# Patient Record
Sex: FEMALE | Race: BLACK OR AFRICAN AMERICAN | NOT HISPANIC OR LATINO
[De-identification: names, ages, dates, MRNs, and addresses within clinical notes are randomized per-mention and may not be internally consistent; named-entity substitution may affect disease eponyms.]

---

## 2017-10-11 ENCOUNTER — TRANSCRIPTION ENCOUNTER (OUTPATIENT)
Age: 37
End: 2017-10-11

## 2017-11-14 ENCOUNTER — TRANSCRIPTION ENCOUNTER (OUTPATIENT)
Age: 37
End: 2017-11-14

## 2017-11-29 ENCOUNTER — TRANSCRIPTION ENCOUNTER (OUTPATIENT)
Age: 37
End: 2017-11-29

## 2018-04-16 ENCOUNTER — TRANSCRIPTION ENCOUNTER (OUTPATIENT)
Age: 38
End: 2018-04-16

## 2018-04-24 ENCOUNTER — TRANSCRIPTION ENCOUNTER (OUTPATIENT)
Age: 38
End: 2018-04-24

## 2020-11-03 ENCOUNTER — OUTPATIENT (OUTPATIENT)
Dept: INPATIENT UNIT | Facility: HOSPITAL | Age: 40
LOS: 1 days | Discharge: HOME | End: 2020-11-03
Payer: COMMERCIAL

## 2020-11-03 ENCOUNTER — ASOB RESULT (OUTPATIENT)
Age: 40
End: 2020-11-03

## 2020-11-03 ENCOUNTER — EMERGENCY (EMERGENCY)
Facility: HOSPITAL | Age: 40
LOS: 1 days | Discharge: STILL ADMITTED OR ADMINISTRATIVE | End: 2020-11-03
Admitting: EMERGENCY MEDICINE

## 2020-11-03 ENCOUNTER — APPOINTMENT (OUTPATIENT)
Dept: ANTEPARTUM | Facility: HOSPITAL | Age: 40
End: 2020-11-03

## 2020-11-03 VITALS — SYSTOLIC BLOOD PRESSURE: 138 MMHG | HEART RATE: 64 BPM | DIASTOLIC BLOOD PRESSURE: 78 MMHG

## 2020-11-03 VITALS
TEMPERATURE: 97 F | DIASTOLIC BLOOD PRESSURE: 74 MMHG | RESPIRATION RATE: 16 BRPM | SYSTOLIC BLOOD PRESSURE: 134 MMHG | HEART RATE: 70 BPM

## 2020-11-03 VITALS — DIASTOLIC BLOOD PRESSURE: 81 MMHG | HEART RATE: 59 BPM | SYSTOLIC BLOOD PRESSURE: 144 MMHG

## 2020-11-03 DIAGNOSIS — Z98.891 HISTORY OF UTERINE SCAR FROM PREVIOUS SURGERY: Chronic | ICD-10-CM

## 2020-11-03 DIAGNOSIS — O26.899 OTHER SPECIFIED PREGNANCY RELATED CONDITIONS, UNSPECIFIED TRIMESTER: ICD-10-CM

## 2020-11-03 DIAGNOSIS — D27.0 BENIGN NEOPLASM OF RIGHT OVARY: Chronic | ICD-10-CM

## 2020-11-03 DIAGNOSIS — Z90.81 ACQUIRED ABSENCE OF SPLEEN: Chronic | ICD-10-CM

## 2020-11-03 DIAGNOSIS — Z3A.00 WEEKS OF GESTATION OF PREGNANCY NOT SPECIFIED: ICD-10-CM

## 2020-11-03 DIAGNOSIS — Z98.890 OTHER SPECIFIED POSTPROCEDURAL STATES: Chronic | ICD-10-CM

## 2020-11-03 LAB
ALBUMIN SERPL ELPH-MCNC: 3.5 G/DL — SIGNIFICANT CHANGE UP (ref 3.3–5)
ALP SERPL-CCNC: 57 U/L — SIGNIFICANT CHANGE UP (ref 40–120)
ALT FLD-CCNC: 26 U/L — SIGNIFICANT CHANGE UP (ref 4–33)
ANION GAP SERPL CALC-SCNC: 11 MMO/L — SIGNIFICANT CHANGE UP (ref 7–14)
APPEARANCE UR: CLEAR — SIGNIFICANT CHANGE UP
APTT BLD: 28.7 SEC — SIGNIFICANT CHANGE UP (ref 27–36.3)
AST SERPL-CCNC: 21 U/L — SIGNIFICANT CHANGE UP (ref 4–32)
BACTERIA # UR AUTO: NEGATIVE — SIGNIFICANT CHANGE UP
BASOPHILS # BLD AUTO: 0.05 K/UL — SIGNIFICANT CHANGE UP (ref 0–0.2)
BASOPHILS NFR BLD AUTO: 0.4 % — SIGNIFICANT CHANGE UP (ref 0–2)
BILIRUB SERPL-MCNC: 0.4 MG/DL — SIGNIFICANT CHANGE UP (ref 0.2–1.2)
BILIRUB UR-MCNC: NEGATIVE — SIGNIFICANT CHANGE UP
BLOOD UR QL VISUAL: SIGNIFICANT CHANGE UP
BUN SERPL-MCNC: 10 MG/DL — SIGNIFICANT CHANGE UP (ref 7–23)
CALCIUM SERPL-MCNC: 9.5 MG/DL — SIGNIFICANT CHANGE UP (ref 8.4–10.5)
CHLORIDE SERPL-SCNC: 104 MMOL/L — SIGNIFICANT CHANGE UP (ref 98–107)
CO2 SERPL-SCNC: 22 MMOL/L — SIGNIFICANT CHANGE UP (ref 22–31)
COLOR SPEC: YELLOW — SIGNIFICANT CHANGE UP
CREAT ?TM UR-MCNC: 200.1 MG/DL — SIGNIFICANT CHANGE UP
CREAT SERPL-MCNC: 0.89 MG/DL — SIGNIFICANT CHANGE UP (ref 0.5–1.3)
EOSINOPHIL # BLD AUTO: 0.11 K/UL — SIGNIFICANT CHANGE UP (ref 0–0.5)
EOSINOPHIL NFR BLD AUTO: 0.8 % — SIGNIFICANT CHANGE UP (ref 0–6)
FIBRINOGEN PPP-MCNC: 720 MG/DL — HIGH (ref 290–520)
GLUCOSE SERPL-MCNC: 73 MG/DL — SIGNIFICANT CHANGE UP (ref 70–99)
GLUCOSE UR-MCNC: NEGATIVE — SIGNIFICANT CHANGE UP
HCT VFR BLD CALC: 36.1 % — SIGNIFICANT CHANGE UP (ref 34.5–45)
HGB BLD-MCNC: 11.4 G/DL — LOW (ref 11.5–15.5)
HYALINE CASTS # UR AUTO: NEGATIVE — SIGNIFICANT CHANGE UP
IMM GRANULOCYTES NFR BLD AUTO: 0.4 % — SIGNIFICANT CHANGE UP (ref 0–1.5)
INR BLD: 1.02 — SIGNIFICANT CHANGE UP (ref 0.88–1.16)
KETONES UR-MCNC: NEGATIVE — SIGNIFICANT CHANGE UP
LDH SERPL L TO P-CCNC: 134 U/L — LOW (ref 135–225)
LEUKOCYTE ESTERASE UR-ACNC: NEGATIVE — SIGNIFICANT CHANGE UP
LYMPHOCYTES # BLD AUTO: 15.8 % — SIGNIFICANT CHANGE UP (ref 13–44)
LYMPHOCYTES # BLD AUTO: 2.16 K/UL — SIGNIFICANT CHANGE UP (ref 1–3.3)
MCHC RBC-ENTMCNC: 28.4 PG — SIGNIFICANT CHANGE UP (ref 27–34)
MCHC RBC-ENTMCNC: 31.6 % — LOW (ref 32–36)
MCV RBC AUTO: 89.8 FL — SIGNIFICANT CHANGE UP (ref 80–100)
MONOCYTES # BLD AUTO: 0.96 K/UL — HIGH (ref 0–0.9)
MONOCYTES NFR BLD AUTO: 7 % — SIGNIFICANT CHANGE UP (ref 2–14)
NEUTROPHILS # BLD AUTO: 10.36 K/UL — HIGH (ref 1.8–7.4)
NEUTROPHILS NFR BLD AUTO: 75.6 % — SIGNIFICANT CHANGE UP (ref 43–77)
NITRITE UR-MCNC: NEGATIVE — SIGNIFICANT CHANGE UP
NRBC # FLD: 0 K/UL — SIGNIFICANT CHANGE UP (ref 0–0)
PH UR: 6 — SIGNIFICANT CHANGE UP (ref 5–8)
PLATELET # BLD AUTO: 336 K/UL — SIGNIFICANT CHANGE UP (ref 150–400)
PMV BLD: 11.5 FL — SIGNIFICANT CHANGE UP (ref 7–13)
POTASSIUM SERPL-MCNC: 3.8 MMOL/L — SIGNIFICANT CHANGE UP (ref 3.5–5.3)
POTASSIUM SERPL-SCNC: 3.8 MMOL/L — SIGNIFICANT CHANGE UP (ref 3.5–5.3)
PROT SERPL-MCNC: 7.3 G/DL — SIGNIFICANT CHANGE UP (ref 6–8.3)
PROT UR-MCNC: 20 — SIGNIFICANT CHANGE UP
PROT UR-MCNC: 21.4 MG/DL — SIGNIFICANT CHANGE UP
PROTHROM AB SERPL-ACNC: 11.6 SEC — SIGNIFICANT CHANGE UP (ref 10.6–13.6)
RBC # BLD: 4.02 M/UL — SIGNIFICANT CHANGE UP (ref 3.8–5.2)
RBC # FLD: 15.1 % — HIGH (ref 10.3–14.5)
RBC CASTS # UR COMP ASSIST: SIGNIFICANT CHANGE UP (ref 0–?)
SODIUM SERPL-SCNC: 137 MMOL/L — SIGNIFICANT CHANGE UP (ref 135–145)
SP GR SPEC: 1.02 — SIGNIFICANT CHANGE UP (ref 1–1.04)
SQUAMOUS # UR AUTO: SIGNIFICANT CHANGE UP
URATE SERPL-MCNC: 5.1 MG/DL — SIGNIFICANT CHANGE UP (ref 2.5–7)
UROBILINOGEN FLD QL: NORMAL — SIGNIFICANT CHANGE UP
WBC # BLD: 13.7 K/UL — HIGH (ref 3.8–10.5)
WBC # FLD AUTO: 13.7 K/UL — HIGH (ref 3.8–10.5)
WBC UR QL: SIGNIFICANT CHANGE UP (ref 0–?)

## 2020-11-03 PROCEDURE — 99203 OFFICE O/P NEW LOW 30 MIN: CPT

## 2020-11-03 PROCEDURE — 76816 OB US FOLLOW-UP PER FETUS: CPT | Mod: 26

## 2020-11-03 PROCEDURE — 76817 TRANSVAGINAL US OBSTETRIC: CPT | Mod: 26

## 2020-11-03 RX ORDER — INDOMETHACIN 50 MG
50 CAPSULE ORAL ONCE
Refills: 0 | Status: COMPLETED | OUTPATIENT
Start: 2020-11-03 | End: 2020-11-03

## 2020-11-03 RX ORDER — ASPIRIN/CALCIUM CARB/MAGNESIUM 324 MG
0 TABLET ORAL
Qty: 0 | Refills: 0 | DISCHARGE

## 2020-11-03 RX ORDER — SODIUM CHLORIDE 9 MG/ML
1000 INJECTION, SOLUTION INTRAVENOUS ONCE
Refills: 0 | Status: COMPLETED | OUTPATIENT
Start: 2020-11-03 | End: 2020-11-03

## 2020-11-03 RX ORDER — NIFEDIPINE 30 MG
1 TABLET, EXTENDED RELEASE 24 HR ORAL
Qty: 0 | Refills: 0 | DISCHARGE

## 2020-11-03 RX ADMIN — Medication 50 MILLIGRAM(S): at 13:55

## 2020-11-03 RX ADMIN — SODIUM CHLORIDE 1000 MILLILITER(S): 9 INJECTION, SOLUTION INTRAVENOUS at 13:55

## 2020-11-03 NOTE — OB PROVIDER TRIAGE NOTE - NSOBPROVIDERNOTE_OBGYN_ALL_OB_FT
Dr Byrd notified of above findings: IVF Bolus, Indocin loading dose. Dr Byrd notified of above findings: IVF Bolus, Indocin loading dose.  -0605-Patient reports relief in pain after indocin and IVF bolus  -mrBPs 133-156/67-71, CHTN, PEC labs resulted above, wnl, P/C ratio: 0.1 Dr Byrd notified of above findings: IVF Bolus, Indocin loading dose.  -0605-Patient reports relief in pain after indocin and IVF bolus  -mrBPs 133-156/67-71, CHTN, PEC labs resulted above, wnl, P/C ratio: 0.1  Dr Byrd updated on new findings and BPs  Patient cleared to be discharged home by Dr Byrd  PCAP clinic called to secure f/u appointment for patient. Patient will be contacted by Clinic  In the meantime, pt encouraged to keep any future appointments with [facility] until transfer of care is complete, and to continue taking procardia.  severe headache unrelieved by tylenol, visual changes, shortness of breath, nausea/vomiting, pain in the right upper abdomen

## 2020-11-03 NOTE — OB PROVIDER TRIAGE NOTE - NSHPPHYSICALEXAM_GEN_ALL_CORE
39 y/o Black female, para 1001 @ 20+5  wks gestation, single IUP.  PTL Vs UTI  Gen: NAD; A+O x 3  Cardiac: S1/S2, R/R/R  Pulm: CTAB, unlabored breathing  Abdomen: Gravid, soft, non-tender  VS-BP: 138/78, P64, RR 18, T37.2, Pain 9/10  [place]: uterine irritability  SSE: +Leukorrhea, Cervix appears C/L/P  MFM TAS:  MFM Cervical length:  Plan:  UA, CBC, IVF Bolus 39 y/o Black female, para 1001 @ 20+5  wks gestation, single IUP.  PTL Vs UTI  Gen: NAD; A+O x 3  Cardiac: S1/S2, R/R/R  Pulm: CTAB, unlabored breathing  Abdomen: Gravid, soft, non-tender  VS-BP: 138/78, P64, RR 18, T37.2, Pain 9/10  BP trends: 138/78, 137/75, 139/77, 133/71, 141/92, 151/94, 150/86, 155/74, 156/67, 134/73  [place]: uterine irritability  SSE: +Leukorrhea, Cervix appears C/L/P  MFM TAS: Transverse Lie, Anterior placenta, MVP 5.75cm,  bpm, EFW 433g, Right anterior fibroid: 7.19 cm X 6.39 cm X 5.9 cm  MFM Cervical length: 4.1 cm, no funneling, no dynamic changes, no previa  Plan:  IVF Bolus, PEC labs, Indocin 39 y/o Black female, para 1001 @ 20+5  wks gestation, single IUP.  PTL Vs UTI  Gen: NAD; A+O x 3  Cardiac: S1/S2, R/R/R  Pulm: CTAB, unlabored breathing  Abdomen: Gravid, soft, non-tender  VS-BP: 138/78, P64, RR 18, T37.2, Pain 9/10  BP trends: 138/78, 137/75, 139/77, 133/71, 141/92, 151/94, 150/86, 155/74, 156/67, 134/73, 151/81, 149/76, 147/80, 150/84  [place]: uterine irritability  SSE: +Leukorrhea, Cervix appears C/L/P  MFM TAS: Transverse Lie, Anterior placenta, MVP 5.75cm,  bpm, EFW 433g, Right anterior fibroid: 7.19 cm X 6.39 cm X 5.9 cm  MFM Cervical length: 4.1 cm, no funneling, no dynamic changes, no previa  Plan:  IVF Bolus, PEC labs, Indocin

## 2020-11-03 NOTE — OB PROVIDER TRIAGE NOTE - ADDITIONAL INSTRUCTIONS
Follow up with current OB provider until you are able to transfer care.  you will be receiving a call from the clinic to schedule an appointment. Please fax your records to: [phone] Attn: Margaret Hegarty  Continue Procardia  Increase hydration  Return to triage for: leakage of fluid, vaginal bleeding, contractions, severe headache unrelieved by tylenol, visual changes, shortness of breath, nausea/vomiting, pain in the right upper abdomen or for any other concerns.

## 2020-11-03 NOTE — OB PROVIDER TRIAGE NOTE - PMH
Endometriosis, uterus    Fibroids    History of blood clots  L Leg 2004  Hypertension  2000  Obesity, Class III, BMI 40-49.9 (morbid obesity)

## 2020-11-03 NOTE — OB PROVIDER TRIAGE NOTE - NS_OBGYNHISTORY_OBGYN_ALL_OB_FT
1/10/2000-Primary LTCS @ 40 wks. Arrest of dilatation. Male, 7# (siPEC/MgSO4)  Endometriosis  h/o B/L ovarian cysts, s/p cystectomy  Uterine fibroids (unknown sizes/location)  Pt denies any h/o: Abn. PAP, breast problems, STDs/STIs

## 2020-11-03 NOTE — OB PROVIDER TRIAGE NOTE - HISTORY OF PRESENT ILLNESS
[facility] patient by default. PNC Provider: [facility]. Patient requesting transfer of care.  39 y/o Black female, para 1001 @ 20+5  wks gestation, single IUP.  Presents to triage with c/o constant abdominal cramping and back pain since yesterday. Patient s/p evaluation and discharge at [facility], was told closed and long cervix. Pt reports three uterine fibroids, unknown size/locations.  Pt denies any urinary symptoms, fever, chills, denies any recent intercourse.   Pt endorses + fetal movement, denies any leakage of fluid or vaginal bleeding.    A/P Complications: Pregestational HTN.   Blood Transfusion: Patient will accept blood    Covid Assessment: Pt denies any: fever, chills, cough, SOB or any recent known exposure to Covid-19.    Allergies: NKDA  Meds: PNV, ASA, Procardia 30mg daily (Last dose @ 8:30 am)  PMH: CHTN, left leg DVT ('14, s/p heparin x 4 months), Obesity/BMI 42.9  PSH: C/S (), Splenectomy/Laparotomy ('04), Laparoscopic B/L ovarian Cystectomy/ Dx Endometriosis ('09)   OBgynHx    1/10/2000-Primary LTCS @ 40 wks. Arrest of dilatation. Male, 7# (siPEC/MgSO4)  Endometriosis  h/o B/L ovarian cysts, s/p cystectomy  Uterine fibroids (unknown sizes/location)  Pt denies any h/o: Abn. PAP, breast problems, STDs/STIs  PSY: Denies  EtOH/ Smoke/ Recreational substance use: denies  FH: Denies  H/W/BMI: 67"/274#/42.9

## 2020-11-03 NOTE — OB PROVIDER TRIAGE NOTE - PSH
Dermoid cyst of both ovaries  removed @ 29 yrs old  H/O splenectomy  2003  History of laparoscopy    Previous  section  01/10/2000 PEC 7-10

## 2020-11-03 NOTE — OB PROVIDER TRIAGE NOTE - PLAN OF CARE
Patient cleared to be discharged home by Dr Byrd  Patient will be contacted by high risk clinic for appointment.  In the meantime, patient to f/u with current OB provider   labor and pre-eclampsia warning signs reviewed with patient. Patient verbalized understanding.

## 2020-11-03 NOTE — OB PROVIDER TRIAGE NOTE - NSHPLABSRESULTS_GEN_ALL_CORE
11.4   13.70 )-----------( 336      ( 2020 14:00 )             36.1         137  |  104  |  10  ----------------------------<  73  3.8   |  22  |  0.89    Ca    9.5      2020 14:37    TPro  7.3  /  Alb  3.5  /  TBili  0.4  /  DBili  x   /  AST  21  /  ALT  26  /  AlkPhos  57  11-03    PT/INR - ( 2020 14:37 )   PT: 11.6 SEC;   INR: 1.02          PTT - ( 2020 14:37 )  PTT:28.7 SEC    Fibrinogen Assay (20 @ 14:37)   Fibrinogen Assay: 720.0 mg/dL Protein, Random Urine (20 @ 14:37)     Urinalysis Basic - ( 2020 13:20 )    Color: YELLOW / Appearance: CLEAR / S.025 / pH: 6.0  Gluc: NEGATIVE / Ketone: NEGATIVE  / Bili: NEGATIVE / Urobili: NORMAL   Blood: SMALL / Protein: 20 / Nitrite: NEGATIVE   Leuk Esterase: NEGATIVE / RBC: 3-5 / WBC 0-2   Sq Epi: FEW / Non Sq Epi: x / Bacteria: NEGATIVE    Protein, Random Urine: 21.4: NO REFERENCE RANGES HAVE BEEN ESTABLISHED. mg/dL    Creatinine, Random Urine (20 @ 14:37)   Creatinine, Random Urine: 200.10: * * RANDOM URINARY CREATININE * *   REFERENCE RANGE   NORMAL = 15-30 MG/KG BODY WEIGHT/DAY mg/dL   P/C Ratio: 0.1    Vital Signs Last 24 Hrs  T(C): 37.2 (2020 13:26), Max: 37.2 (2020 13:23)  T(F): 98.96 (2020 13:26), Max: 99 (2020 13:23)  HR: 67 (2020 15:57) (59 - 73)  BP: 150/84 (2020 15:57) (133/71 - 156/67)  BP(mean): --  RR: 20 (2020 13:23) (16 - 20)  SpO2: --

## 2020-11-04 PROBLEM — Z00.00 ENCOUNTER FOR PREVENTIVE HEALTH EXAMINATION: Status: ACTIVE | Noted: 2020-11-04
